# Patient Record
Sex: FEMALE
[De-identification: names, ages, dates, MRNs, and addresses within clinical notes are randomized per-mention and may not be internally consistent; named-entity substitution may affect disease eponyms.]

---

## 2017-12-23 ENCOUNTER — RESULT REVIEW (OUTPATIENT)
Age: 27
End: 2017-12-23

## 2019-01-03 ENCOUNTER — RESULT REVIEW (OUTPATIENT)
Age: 29
End: 2019-01-03

## 2020-08-26 PROBLEM — Z00.00 ENCOUNTER FOR PREVENTIVE HEALTH EXAMINATION: Status: ACTIVE | Noted: 2020-08-26

## 2020-09-01 ENCOUNTER — APPOINTMENT (OUTPATIENT)
Dept: OBGYN | Facility: CLINIC | Age: 30
End: 2020-09-01
Payer: COMMERCIAL

## 2020-09-01 VITALS
BODY MASS INDEX: 21.69 KG/M2 | WEIGHT: 135 LBS | HEIGHT: 66 IN | DIASTOLIC BLOOD PRESSURE: 70 MMHG | SYSTOLIC BLOOD PRESSURE: 100 MMHG

## 2020-09-01 DIAGNOSIS — Z78.9 OTHER SPECIFIED HEALTH STATUS: ICD-10-CM

## 2020-09-01 DIAGNOSIS — N97.9 FEMALE INFERTILITY, UNSPECIFIED: ICD-10-CM

## 2020-09-01 DIAGNOSIS — Z80.3 FAMILY HISTORY OF MALIGNANT NEOPLASM OF BREAST: ICD-10-CM

## 2020-09-01 PROCEDURE — 99203 OFFICE O/P NEW LOW 30 MIN: CPT

## 2020-09-01 PROCEDURE — 36415 COLL VENOUS BLD VENIPUNCTURE: CPT

## 2020-09-01 PROCEDURE — 99214 OFFICE O/P EST MOD 30 MIN: CPT

## 2020-09-01 NOTE — HISTORY OF PRESENT ILLNESS
[Reproductive Age] : is of reproductive age [Menarche Age: ____] : age at menarche was [unfilled] [Sexually Active] : is sexually active [Good] : being in good health [Healthy Diet] : a healthy diet [Last Pap ___] : Last cervical pap smear was [unfilled] [Definite:  ___ (Date)] : the last menstrual period was [unfilled] [Monogamous] : is monogamous [Menstrual Problems] : reports normal menses [Pregnancy History] : denies prior pregnancies [Contraception] : does not use contraception

## 2020-09-01 NOTE — COUNSELING
[Nutrition] : nutrition [Exercise] : exercise [Vitamins/Supplements] : vitamins/supplements [Menstrual Calendar] : menstrual calendar [Preconception Care] : preconception care

## 2020-09-03 ENCOUNTER — TRANSCRIPTION ENCOUNTER (OUTPATIENT)
Age: 30
End: 2020-09-03

## 2020-09-04 LAB
BASOPHILS # BLD AUTO: 0.04 K/UL
BASOPHILS NFR BLD AUTO: 0.5 %
DHEA-S SERPL-MCNC: 194 UG/DL
EOSINOPHIL # BLD AUTO: 0.04 K/UL
EOSINOPHIL NFR BLD AUTO: 0.5 %
ESTRADIOL SERPL-MCNC: 85 PG/ML
FSH SERPL-MCNC: 7.3 IU/L
HCT VFR BLD CALC: 43.6 %
HGB BLD-MCNC: 13.7 G/DL
IMM GRANULOCYTES NFR BLD AUTO: 0.3 %
LH SERPL-ACNC: 6 IU/L
LYMPHOCYTES # BLD AUTO: 2.98 K/UL
LYMPHOCYTES NFR BLD AUTO: 38.1 %
MAN DIFF?: NORMAL
MCHC RBC-ENTMCNC: 28.2 PG
MCHC RBC-ENTMCNC: 31.4 GM/DL
MCV RBC AUTO: 89.7 FL
MONOCYTES # BLD AUTO: 0.55 K/UL
MONOCYTES NFR BLD AUTO: 7 %
NEUTROPHILS # BLD AUTO: 4.19 K/UL
NEUTROPHILS NFR BLD AUTO: 53.6 %
PLATELET # BLD AUTO: 234 K/UL
PROLACTIN SERPL-MCNC: 12.8 NG/ML
RBC # BLD: 4.86 M/UL
RBC # FLD: 13.3 %
TSH SERPL-ACNC: 1.83 UIU/ML
WBC # FLD AUTO: 7.82 K/UL

## 2020-09-08 ENCOUNTER — APPOINTMENT (OUTPATIENT)
Dept: OBGYN | Facility: CLINIC | Age: 30
End: 2020-09-08
Payer: COMMERCIAL

## 2020-09-08 PROCEDURE — 76830 TRANSVAGINAL US NON-OB: CPT

## 2020-10-08 LAB
ANDROST SERPL-MCNC: 117 NG/DL
ANTI-MUELLERIAN HORMONE: 0.69 NG/ML
SHBG SERPL-SCNC: NORMAL
TESTOST BND SERPL-MCNC: 4.5 PG/ML
TESTOST SERPL-MCNC: 28.7 NG/DL

## 2020-11-09 ENCOUNTER — NON-APPOINTMENT (OUTPATIENT)
Age: 30
End: 2020-11-09